# Patient Record
Sex: MALE | Race: OTHER | NOT HISPANIC OR LATINO | URBAN - METROPOLITAN AREA
[De-identification: names, ages, dates, MRNs, and addresses within clinical notes are randomized per-mention and may not be internally consistent; named-entity substitution may affect disease eponyms.]

---

## 2019-04-24 ENCOUNTER — EMERGENCY (EMERGENCY)
Facility: HOSPITAL | Age: 64
LOS: 1 days | Discharge: HOME | End: 2019-04-24
Admitting: EMERGENCY MEDICINE
Payer: COMMERCIAL

## 2019-04-24 VITALS
HEART RATE: 88 BPM | DIASTOLIC BLOOD PRESSURE: 93 MMHG | RESPIRATION RATE: 18 BRPM | TEMPERATURE: 98 F | SYSTOLIC BLOOD PRESSURE: 154 MMHG | OXYGEN SATURATION: 100 %

## 2019-04-24 PROCEDURE — 73030 X-RAY EXAM OF SHOULDER: CPT | Mod: 26,RT

## 2019-04-24 PROCEDURE — 99283 EMERGENCY DEPT VISIT LOW MDM: CPT

## 2019-04-24 RX ORDER — ACETAMINOPHEN 500 MG
975 TABLET ORAL ONCE
Qty: 0 | Refills: 0 | Status: COMPLETED | OUTPATIENT
Start: 2019-04-24 | End: 2019-04-24

## 2019-04-24 RX ORDER — IBUPROFEN 200 MG
600 TABLET ORAL ONCE
Qty: 0 | Refills: 0 | Status: COMPLETED | OUTPATIENT
Start: 2019-04-24 | End: 2019-04-24

## 2019-04-24 RX ORDER — DIAZEPAM 5 MG
5 TABLET ORAL ONCE
Qty: 0 | Refills: 0 | Status: DISCONTINUED | OUTPATIENT
Start: 2019-04-24 | End: 2019-04-24

## 2019-04-24 RX ADMIN — Medication 600 MILLIGRAM(S): at 09:50

## 2019-04-24 RX ADMIN — Medication 5 MILLIGRAM(S): at 09:50

## 2019-04-24 RX ADMIN — Medication 975 MILLIGRAM(S): at 09:50

## 2019-04-24 NOTE — ED PROVIDER NOTE - PHYSICAL EXAMINATION
right shoulder: +deltoid tenderness to palpation. no bruising or swelling. no gross deformity. limited flexion secondary to pain. sensation intact to deltoid and throughout. radial pulse 2+. capillary refill <2 seconds. + strength equal b/l. +elbow flexion and extension 5/5.

## 2019-04-24 NOTE — ED PROVIDER NOTE - CLINICAL SUMMARY MEDICAL DECISION MAKING FREE TEXT BOX
64 y/o male with no pmhx presents to ED c/o right shoulder pain s/p lifting heavy object yesterday. r.o dislocation, fracture. plan- pain control, valium, xray, reassess.

## 2019-04-24 NOTE — ED ADULT TRIAGE NOTE - CHIEF COMPLAINT QUOTE
Pt states yesterday lifted a heavy object and injured his shoulder . pt reports limited rom to right arm.

## 2019-04-24 NOTE — ED PROVIDER NOTE - OBJECTIVE STATEMENT
62 y/o male with no pmhx presents to ED c/o right shoulder pain s/p lifting heavy object yesterday. States he heard a pop in his shoulder and as a result ended dropping the object. 8/10 pain with movement. Minimal pain with no movement. Did not take pain meds. No hx of shoulder dislocations. No fever, chills, cp, sob, palpitations, n/v, weakness, numbness, tingling, lacerations, swelling or bruising.

## 2019-04-24 NOTE — ED PROVIDER NOTE - NSFOLLOWUPINSTRUCTIONS_ED_ALL_ED_FT
Follow up with Ortho within the week- referral list provided.  Take Motrin 600mg 1 tab every 6-8 hrs as needed with food for pain. Keep sling on for support.  Worsening pain, numbness, weakness, swelling or new concerning symptoms return to the Emergency Department.

## 2019-04-30 PROBLEM — Z78.9 OTHER SPECIFIED HEALTH STATUS: Chronic | Status: ACTIVE | Noted: 2019-04-24

## 2019-05-03 ENCOUNTER — APPOINTMENT (OUTPATIENT)
Dept: ORTHOPEDIC SURGERY | Facility: CLINIC | Age: 64
End: 2019-05-03

## 2019-05-07 ENCOUNTER — APPOINTMENT (OUTPATIENT)
Dept: ORTHOPEDIC SURGERY | Facility: CLINIC | Age: 64
End: 2019-05-07
Payer: COMMERCIAL

## 2019-05-07 VITALS
HEART RATE: 75 BPM | BODY MASS INDEX: 25.39 KG/M2 | DIASTOLIC BLOOD PRESSURE: 80 MMHG | SYSTOLIC BLOOD PRESSURE: 131 MMHG | WEIGHT: 158 LBS | HEIGHT: 66 IN

## 2019-05-07 PROCEDURE — 99205 OFFICE O/P NEW HI 60 MIN: CPT

## 2019-05-07 PROCEDURE — 73030 X-RAY EXAM OF SHOULDER: CPT | Mod: RT

## 2019-05-07 NOTE — PHYSICAL EXAM
[de-identified] : Physical Examination\par General: well nourished, in no acute distress, alert and oriented to person, place and time\par Psychiatric: normal mood and affect, no abnormal movements or speech patterns\par Eyes: vision intact - glasses\par Throat: no thyromegaly\par Lymph: no enlarged nodes, no lymphedema in extremity\par Respiratory: no wheezing, no shortness of breath with ambulation\par Cardiac: no cardiac leg swelling, 2+ peripheral pulses\par Neurology: normal gross sensation in extremities to light touch\par Abdomen: soft, non-tender, tympanic, no masses\par \par Musculoskeletal Examination\par Cervical spine	Full painless range of motion and negative Spurling's test\par \par Shoulder			Right			Left\par Appearance\par      Skin/Swelling/Deformity	normal			normal\par      Scapular Winging		-			-\par Range of Motion\par      Forward Flexion		20 / 150		170 / 170\par      Abduction			20 / 150		170 / 170\par      External Rotation		45			45\par      Internal Rotation		post hip			T10\par      SAbd Ext Rotation		70			90\par      SAbd Int Rotation		30			80\par      Painful Arc			+			-\par      Crepitus			-			-\par Palpation\par      Clavicle			-			-\par      AC Joint			-			-\par      Posterior Acromion		-			-\par      Levator Scapula		-			-\par      Lateral Bursa			+			-\par      Impingement Area		+			-\par      Biceps Tendon		+			-\par      Anterior Capsule		+			-\par Strength Examination\par      Supraspinatous 		3+ / +			5+ / 0\par      Infraspinatous			3+ / +			5+ / 0\par      Subscapularis			5+ / 0			5+ / 0\par      Belly Press			5+ / 0			5+ / 0\par      Lift Off			-&			-\par      Drop-Arm			+			-\par Special Examination\par      Biceps Sterling's		+ both			-\par      Impingement Neer		+			-\par      Impingement Hawking		+			-\par \par Sensation\par      Axillary			normal			normal\par      LatAntCubBrach 		normal			normal\par      Median 			normal			normal\par      Ulnar 			normal			normal\par      Radial 			normal			normal\par Motor\par      AIN 				normal			normal\par      Ulnar 			normal			normal\par      Radial 			normal			normal\par      PIN 				normal			normal\par Pulses\par      Radial			2+			2+ [de-identified] : 4 views of the affected Right shoulder (AP, Glenoid, Y-View, Axillary)\par were ordered, obtained and evaluated by myself today and\par demonstrate:\par normal bony calcification without dislocation and no fracture\par 	Arch	2B\par 	AC Joint	mild Arthrosis\par 	GH Joint	trace Arthrosis\par 	Calcifications	none

## 2019-05-07 NOTE — HISTORY OF PRESENT ILLNESS
[de-identified] : CC Right shoulder\par \par HPI 64 yo male right HD presents with acute onset of 2 weeks of activity related and constant pain in the lateral Right shoulder after lifting a heavy vacuum  and immediate loss of strength and ability to elevate the arm. The pain is better, and rated a 5 out of 10, described as weakness burning aching she, [without radiation]. Rest makes the pain better and shoulder motion and lifting objects makes the pain worse. The patient reports associated symptoms of instability weakness. The patient + pain at night affecting sleep, - neck pain, and - similar pain previously.\par \par The patient has tried the following treatments:\par Activity modification	+\par Ice			+\par Nsaids    		+\par Physical Therapy  	-\par Cortisone Injection	-\par Arthroscopy/Surgery	-\par \par Review of Systems is positive for the above musculoskeletal symptoms and is otherwise non-contributory for general, constitutional, psychiatric, neurologic, HEENT, cardiac, respiratory, gastrointestinal, reproductive, lymphatic, and dermatologic complaints.\par \par Consult by Dr Ziggy Phipps

## 2019-05-07 NOTE — DISCUSSION/SUMMARY
[de-identified] : Right shoulder rotator cuff  insufficiency, biceps tendinitis, impingement syndrome\par \par I discussed my findings on history, exam and radiology.\par \par I reviewed the anatomy and function of the shoulder rotator cuff muscles and tendons, biceps tendon and labrum.Given the current findings for the patient, I recommend proceeding with advanced imaging to better characterize and diagnose the problem to aid patient care, management and treatment guidance as I suspect an internal injury to the knee not diagnosed on non-diagnostic plain radiographs causing the patients symptoms and physical examination to help provide surgical management planning.\par \par Therefore given the patients continued symptoms despite activity modification and anti-inflammatory pain medications and ice the patient has continued pain and weakness and impaired sleep affecting the patient's ADLs and limiting activities negatively affecting the patient's quality of life, examination and history consistent with internal shoulder derangement or rotator cuff injury with weakness of the rotator cuff muscles along with severe pain I am recommending obtaining advanced MRI imaging of the shoulder to identify damaged structures in order to facilite preopertive planning. I discussed with the patient that I am ordering an MRI to assess the soft tissue structures in the joint such as the joint capsule, ligaments, rotator cuff and biceps tendons, as well as to assess the cartilage, muscle bellies, cysts, AC joint edema or other pathology. The test will need insurance approval and will take place at a [facility] facility or outside facility. If the patient elects to obtain the MRI from an outside facility, the patient understands they have been instructed to bring in both the final radiologist read and a CD/DVD with the MRI images to allow review of the imaging test by myself during the follow up visit. I do not recommend obtaining an open standing MRI as the quality of the images is subpar and makes it difficult to diagnose intra-articular derangements and guide care discussion and decision making.\par \par The patient was prescribed Diclofenac PO non-steroidal anti-inflammatory medication. 50mg tablets twice daily to be taken for at least 1-2 weeks in a row and then PRN afterwards. Risks and benefits were discussed and include but not limited to renal damage and GI ulceration and bleeding.  They were advised to take with food to limit stomach upset as well as warned to stop the medication if worsening gastric pain or dizziness or other side effects. Also to immediately stop the medication and seek appropriate medical attention if any severe stomach ache, gastritis, black/red vomit, black/red stools or any other medical concern.\par \par The patient verifies their understanding the the visit, diagnosis and plan. They agree with the treatment plan and will contact the office with any questions or problems.\par \par FU after MRI completed

## 2019-06-05 ENCOUNTER — APPOINTMENT (OUTPATIENT)
Dept: ORTHOPEDIC SURGERY | Facility: CLINIC | Age: 64
End: 2019-06-05
Payer: COMMERCIAL

## 2019-06-05 PROCEDURE — 99214 OFFICE O/P EST MOD 30 MIN: CPT

## 2019-06-07 ENCOUNTER — MOBILE ON CALL (OUTPATIENT)
Age: 64
End: 2019-06-07

## 2019-06-11 NOTE — HISTORY OF PRESENT ILLNESS
[de-identified] : CC Right shoulder\par \par HPI 64 yo male right HD presents with MRI review of acute onset of 2 weeks of activity related and constant pain in the lateral Right shoulder after lifting a heavy vacuum  and immediate loss of strength and ability to elevate the arm. The pain is better, and rated a 5 out of 10, described as weakness burning aching she, [without radiation]. Rest makes the pain better and shoulder motion and lifting objects makes the pain worse. The patient reports associated symptoms of instability weakness. The patient + pain at night affecting sleep, - neck pain, and - similar pain previously.\par \par The patient has tried the following treatments:\par Activity modification	+\par Ice			+\par Nsaids    		+\par Physical Therapy  	-\par Cortisone Injection	-\par Arthroscopy/Surgery	-\par \par Review of Systems is positive for the above musculoskeletal symptoms and is otherwise non-contributory for general, constitutional, psychiatric, neurologic, HEENT, cardiac, respiratory, gastrointestinal, reproductive, lymphatic, and dermatologic complaints.\par \par Consult by Dr Ziggy Phipps

## 2019-06-11 NOTE — DISCUSSION/SUMMARY
[de-identified] : Right shoulder rotator cuff full thickness, biceps tendinitis, impingement syndrome\par \par I discussed my findings on history, exam and radiology.\par \par We discussed at length the anatomy, function and tear pattern of the rotator cuff using models, diagrams and drawings. We reviewed the patient's physical exam, radiographic images and history. We discussed the expected outcome of non-operative conservative treatment versus arthroscopic operative rotator cuff repair. Non-operative management consists of patient education, activity modification, corticosteroid injection, PO or topical NSAIDs, and formal physical therapy. Partial thickness tears do have some limited healing potential but infrequently heal completely and with time sometimes progress towards a focal full thickness tear. Smaller tears without retraction are expected to progress and enlarge overtime to larger full thickness tears with retraction. Full thickness rotator cuff tears, in a vast majority of circumstances, do not heal without surgical treatment. The larger the tear becomes, the more difficult the repair is technically and protracts and slows rehabilitation. As the full thickness rotator cuff tear progresses over time, the torn tendon edge retracted due to intrinsic tendon changes to its strength and elasticity, which along with progressive rotator cuff muscle belly atrophy and fatty degeneration makes surgical management both less effective and more difficult. Given enough chronic tendon changes and degenerative muscle changes, the cuff may become unrepairable, necessitating larger, more costly, less predictable poorer outcome, reconstructive arthroscopic or open surgeries including a reverse shoulder replacement.\par \par Operative and nonoperative management modalities were discussed with the patient\par Patient wishes to proceed with nonoperative modalities at this time\par \par Physical therapy was prescribed for ROM exercises, cuff/periscapular strengthing, scapular posture, modalities PRN, home exercise program\par Continue prescribed Diclofenac PO non-steroidal anti-inflammatory medication. 50mg tablets twice daily to be taken for at least 1-2 weeks in a row and then PRN afterwards. Risks and benefits were discussed and include but not limited to renal damage and GI ulceration and bleeding.  They were advised to take with food to limit stomach upset as well as warned to stop the medication if worsening gastric pain or dizziness or other side effects. Also to immediately stop the medication and seek appriate medical attention if any severe stomach ache, gastritis, black/red vomit, black/red stools or any other medical concern.\par \par Long-term consequences of failure to repair rotator cuff were discussed I advised the patient have routine followup continue with nonoperative management\par \par Do not recommend a corticosteroid injection at this time due possibility of a rotator cuff repair due to increased incidence of infection as well as the possible risk of decreased tendon to bone healing\par \par The patient verifies their understanding the the visit, diagnosis and plan. They agree with the treatment plan and will contact the office with any questions or problems.\par Follow up\par After PT completed\par \par addendum.\par patient decided to proceed with surgery

## 2019-06-11 NOTE — PHYSICAL EXAM
[de-identified] : Physical Examination\par General: well nourished, in no acute distress, alert and oriented to person, place and time\par Psychiatric: normal mood and affect, no abnormal movements or speech patterns\par Eyes: vision intact - glasses\par Throat: no thyromegaly\par Lymph: no enlarged nodes, no lymphedema in extremity\par Respiratory: no wheezing, no shortness of breath with ambulation\par Cardiac: no cardiac leg swelling, 2+ peripheral pulses\par Neurology: normal gross sensation in extremities to light touch\par Abdomen: soft, non-tender, tympanic, no masses\par \par Musculoskeletal Examination\par Cervical spine	Full painless range of motion and negative Spurling's test\par \par Shoulder			Right			Left\par Appearance\par      Skin/Swelling/Deformity	normal			normal\par      Scapular Winging		-			-\par Range of Motion\par      Forward Flexion		20 / 150		170 / 170\par      Abduction			20 / 150		170 / 170\par      External Rotation		45			45\par      Internal Rotation		post hip			T10\par      SAbd Ext Rotation		70			90\par      SAbd Int Rotation		30			80\par      Painful Arc			+			-\par      Crepitus			-			-\par Palpation\par      Clavicle			-			-\par      AC Joint			-			-\par      Posterior Acromion		-			-\par      Levator Scapula		-			-\par      Lateral Bursa			+			-\par      Impingement Area		+			-\par      Biceps Tendon		+			-\par      Anterior Capsule		+			-\par Strength Examination\par      Supraspinatous 		3+ / +			5+ / 0\par      Infraspinatous			3+ / +			5+ / 0\par      Subscapularis			5+ / 0			5+ / 0\par      Belly Press			5+ / 0			5+ / 0\par      Lift Off			-&			-\par      Drop-Arm			+			-\par Special Examination\par      Biceps Washington's		+ both			-\par      Impingement Neer		+			-\par      Impingement Hawking		+			-\par \par Sensation\par      Axillary			normal			normal\par      LatAntCubBrach 		normal			normal\par      Median 			normal			normal\par      Ulnar 			normal			normal\par      Radial 			normal			normal\par Motor\par      AIN 				normal			normal\par      Ulnar 			normal			normal\par      Radial 			normal			normal\par      PIN 				normal			normal\par Pulses\par      Radial			2+			2+ [de-identified] : 4 views of the affected Right shoulder (AP, Glenoid, Y-View, Axillary)\par 5-2019\par demonstrate:\par normal bony calcification without dislocation and no fracture\par 	Arch	2B\par 	AC Joint	mild Arthrosis\par 	GH Joint	trace Arthrosis\par 	Calcifications	none\par \par MRI Right shoulder from [facility] on 5-21-19\par My impression of the images:\par Quality of the MRI is good\par Supraspinatous Tendon tear of the supraspinatus tendon of the entire footprint with retraction to the mid humeral head\par Infraspinatous Tendon tendinosis\par Subscapularis Tendon tendinosis with inferior articular partial tear\par Teres Minor Tendon ok\par Muscle Belly Atrophy none\par Biceps Tendon is in the groove and looks swollen intra-articularly but poorly visualized with a stable attachment anchor\par Superior Labrum ok\par Anterior Labrum ok\par Posterior Labrum ok\par AC joint mild signal\par There is no full thickness chondral lesion of the glenoid and humeral head\par \par The Final Radiologist Impression:\par The skin and subcutis are unremarkable.\par There is moderate AC joint osteoarthritis. No subacromial enthesophyte is visualized.\par There is a small effusion in the subacromial/subdeltoid bursa.\par There is a small glenohumeral joint effusion.\par There is a full-thickness tear involving the anterior two thirds of the supraspinatus\par tendon, with retraction of torn tendon fibers at the level of the lateral acromion. See\par oblique coronal images 14-21 of series 5, oblique sagittal images 5-7 of series 6. A\par full-thickness tear is an AP dimension of 1.8 cm.\par The infraspinatus tendon exhibits moderate tendinosis. There is associated edema at the\par myotendinous junction.\par The subscapularis tendon exhibits moderate tendinosis. There is an associated\par enthesopathic cyst at the insertion on the lesser tuberosity.\par The long head biceps tendon exhibits moderate tendinosis and tenosynovitis.\par The capsulolabral complex is unremarkable, without evidence of labrum tear or\par degeneration.\par No significant arthritic changes are visualized at the glenohumeral joint.\par There is no evidence of fracture, osteonecrosis, or osseous neoplasm.\par The suprascapular nerve is unremarkable in MR appearance.\par The visualized muscle bellies exhibit normal size, shape and signal, without evidence of\par edema or atrophy.\par No soft tissue mass is visualized.\par No soft tissue cyst or extrasynovial fluid collection is visualized.\par Impression:\par Rotator cuff tendinosis as detailed above, with a full-thickness tear of the supraspinatus\par tendon. Moderate AC joint osteoarthritis. Small joint and bursal effusions. Moderate long\par head biceps tendinosis and tenosynovitis.

## 2019-07-03 ENCOUNTER — APPOINTMENT (OUTPATIENT)
Dept: ORTHOPEDIC SURGERY | Facility: CLINIC | Age: 64
End: 2019-07-03
Payer: COMMERCIAL

## 2019-07-03 PROCEDURE — 99214 OFFICE O/P EST MOD 30 MIN: CPT

## 2019-07-03 RX ORDER — DOCUSATE SODIUM 100 MG/1
100 CAPSULE, LIQUID FILLED ORAL TWICE DAILY
Qty: 50 | Refills: 0 | Status: COMPLETED | COMMUNITY
Start: 2019-07-03 | End: 2019-07-28

## 2019-07-03 RX ORDER — ASPIRIN 325 MG/1
325 TABLET, FILM COATED ORAL
Qty: 28 | Refills: 0 | Status: COMPLETED | COMMUNITY
Start: 2019-07-03 | End: 2019-08-02

## 2019-07-03 NOTE — DISCUSSION/SUMMARY
[de-identified] : Right shoulder rotator cuff full thickness, biceps tendinitis, impingement syndrome\par \par I discussed my findings on history, exam and radiology.\par \par We discussed at length the anatomy, function and tear pattern of the rotator cuff using models, diagrams and drawings. We reviewed the patient's physical exam, radiographic images and history. We discussed the expected outcome of non-operative conservative treatment versus arthroscopic operative rotator cuff repair. Non-operative management consists of patient education, activity modification, corticosteroid injection, PO or topical NSAIDs, and formal physical therapy. Partial thickness tears do have some limited healing potential but infrequently heal completely and with time sometimes progress towards a focal full thickness tear. Smaller tears without retraction are expected to progress and enlarge overtime to larger full thickness tears with retraction. Full thickness rotator cuff tears, in a vast majority of circumstances, do not heal without surgical treatment. The larger the tear becomes, the more difficult the repair is technically and protracts and slows rehabilitation. As the full thickness rotator cuff tear progresses over time, the torn tendon edge retracted due to intrinsic tendon changes to its strength and elasticity, which along with progressive rotator cuff muscle belly atrophy and fatty degeneration makes surgical management both less effective and more difficult. Given enough chronic tendon changes and degenerative muscle changes, the cuff may become unrepairable, necessitating larger, more costly, less predictable poorer outcome, reconstructive arthroscopic or open surgeries including a reverse shoulder replacement.\par \par Operative and nonoperative management modalities were discussed with the patient\par Patient wishes to proceed with nonoperative modalities at this time\par \par I have discussed the treatment options with Mr. HUBBARD . At this point he remains symptomatic despite trial of non-operative management. He is having persistent symptoms and has been unable to return to his usual and customary occupation. In light of the patient's complaints, my recommendation at this point is to proceed with surgical arthroscopy of the right shoulder with examination under anesthesia, arthroscopic subacromial decompression, possible mini- Barrie versus Barrie resection of the distal clavicle, careful evaluation of the long head of the biceps for any necessary debridement versus tenodesis/tenotomy, careful evaluation of the rotator cuff with debridement versus repair. I have explained the nature of the surgery with images, diagrams and models as well as the expected postoperative course including immobilization in a surgical sling and intensive physical therapy.. The patient is advised of the risks and benefits and alternatives of surgery. Risks include, but were not limited to future surgeries, infection, bleeding, damage to blood vessels and nerves, continued pain, inability to complete tear repair due to size and condition of the tear, failure of cuff repair to heal, re-tear of the rotator cuff, loss of motion, deep venous thrombosis/pulmonary embolus, acromial stress fracture, AC joint instability, nerve injury, iatrogenic injury to shoulder structures, positional complications including eye irritation and compression, complications due to anesthesia including myocardial infarction, stroke, death, etc.The Mr. HUBBARD's questions were answered. He appeared to understand the risks, benefits, and alternatives of the surgical procedure. I advised that there are no guarantees as to outcome and that the ultimate improvement will be largely based on the extent of rotator cuff and long head biceps involvement and that he may not gain full strength nor full range motion of the shoulder\par \par Mr. HUBBARD  gave written and verbal consent to proceed.\par \par \par Mr. HUBBARD denies personal or family history of DVT/blood clots, has been ambulatory and does not smoke. No risk factors for DVT/PE, will prescribe [id number] daily for 4 weeks postoperatively beginning POD 1.\par \par He was  instructed on signs and symptoms of DVT/PE including lower leg swelling, calf cramp like pain, difficulty breaking, shortness of breath and chest pain. They will proceed immediately to the ER if any of these symptoms occur and contact me.\par \par He was prescribed a 3 day course of oral antibiotics to prevent against surgical infection. Instructed to start after returning home from surgery when able to tolerate PO food intake. This was prescribed to decrease the possibility of postop infection.\par \par For post operative pain control, the patient was given a prescription for percocet 5/325 1-2 every 4-6 hour to not exceed 4g tylenol per 24 hour period. I recommend taking medication as necessary postop for pain control. Taking NSAIDs such as Advil or Aleve is also ok with the narcotic pain medication. I did warn Mr. HUBBARD against concomitant use of Tylenol since the narcotic pain medication includes Tylenol already, as taking both together can injure the liver.\par \par FU postop 1-2 weeks

## 2019-07-03 NOTE — PHYSICAL EXAM
[de-identified] : Physical Examination\par General: well nourished, in no acute distress, alert and oriented to person, place and time\par Psychiatric: normal mood and affect, no abnormal movements or speech patterns\par Eyes: vision intact - glasses\par Throat: no thyromegaly\par Lymph: no enlarged nodes, no lymphedema in extremity\par Respiratory: no wheezing, no shortness of breath with ambulation\par Cardiac: no cardiac leg swelling, 2+ peripheral pulses\par Neurology: normal gross sensation in extremities to light touch\par Abdomen: soft, non-tender, tympanic, no masses\par \par Musculoskeletal Examination\par Cervical spine	Full painless range of motion and negative Spurling's test\par \par Shoulder			Right			Left\par Appearance\par      Skin/Swelling/Deformity	normal			normal\par      Scapular Winging		-			-\par Range of Motion\par      Forward Flexion		20 / 150		170 / 170\par      Abduction			20 / 150		170 / 170\par      External Rotation		45			45\par      Internal Rotation		post hip			T10\par      SAbd Ext Rotation		70			90\par      SAbd Int Rotation		30			80\par      Painful Arc			+			-\par      Crepitus			-			-\par Palpation\par      Clavicle			-			-\par      AC Joint			-			-\par      Posterior Acromion		-			-\par      Levator Scapula		-			-\par      Lateral Bursa			+			-\par      Impingement Area		+			-\par      Biceps Tendon		+			-\par      Anterior Capsule		+			-\par Strength Examination\par      Supraspinatous 		3+ / +			5+ / 0\par      Infraspinatous			3+ / +			5+ / 0\par      Subscapularis			5+ / 0			5+ / 0\par      Belly Press			5+ / 0			5+ / 0\par      Lift Off			-&			-\par      Drop-Arm			+			-\par Special Examination\par      Biceps Turin's		+ both			-\par      Impingement Neer		+			-\par      Impingement Hawking		+			-\par \par Sensation\par      Axillary			normal			normal\par      LatAntCubBrach 		normal			normal\par      Median 			normal			normal\par      Ulnar 			normal			normal\par      Radial 			normal			normal\par Motor\par      AIN 				normal			normal\par      Ulnar 			normal			normal\par      Radial 			normal			normal\par      PIN 				normal			normal\par Pulses\par      Radial			2+			2+ [de-identified] : 4 views of the affected Right shoulder (AP, Glenoid, Y-View, Axillary)\par 5-2019\par demonstrate:\par normal bony calcification without dislocation and no fracture\par 	Arch	2B\par 	AC Joint	mild Arthrosis\par 	GH Joint	trace Arthrosis\par 	Calcifications	none\par \par MRI Right shoulder from [facility] on 5-21-19\par My impression of the images:\par Quality of the MRI is good\par Supraspinatous Tendon tear of the supraspinatus tendon of the entire footprint with retraction to the mid humeral head\par Infraspinatous Tendon tendinosis\par Subscapularis Tendon tendinosis with inferior articular partial tear\par Teres Minor Tendon ok\par Muscle Belly Atrophy none\par Biceps Tendon is in the groove and looks swollen intra-articularly but poorly visualized with a stable attachment anchor\par Superior Labrum ok\par Anterior Labrum ok\par Posterior Labrum ok\par AC joint mild signal\par There is no full thickness chondral lesion of the glenoid and humeral head\par \par The Final Radiologist Impression:\par The skin and subcutis are unremarkable.\par There is moderate AC joint osteoarthritis. No subacromial enthesophyte is visualized.\par There is a small effusion in the subacromial/subdeltoid bursa.\par There is a small glenohumeral joint effusion.\par There is a full-thickness tear involving the anterior two thirds of the supraspinatus\par tendon, with retraction of torn tendon fibers at the level of the lateral acromion. See\par oblique coronal images 14-21 of series 5, oblique sagittal images 5-7 of series 6. A\par full-thickness tear is an AP dimension of 1.8 cm.\par The infraspinatus tendon exhibits moderate tendinosis. There is associated edema at the\par myotendinous junction.\par The subscapularis tendon exhibits moderate tendinosis. There is an associated\par enthesopathic cyst at the insertion on the lesser tuberosity.\par The long head biceps tendon exhibits moderate tendinosis and tenosynovitis.\par The capsulolabral complex is unremarkable, without evidence of labrum tear or\par degeneration.\par No significant arthritic changes are visualized at the glenohumeral joint.\par There is no evidence of fracture, osteonecrosis, or osseous neoplasm.\par The suprascapular nerve is unremarkable in MR appearance.\par The visualized muscle bellies exhibit normal size, shape and signal, without evidence of\par edema or atrophy.\par No soft tissue mass is visualized.\par No soft tissue cyst or extrasynovial fluid collection is visualized.\par Impression:\par Rotator cuff tendinosis as detailed above, with a full-thickness tear of the supraspinatus\par tendon. Moderate AC joint osteoarthritis. Small joint and bursal effusions. Moderate long\par head biceps tendinosis and tenosynovitis.

## 2019-07-03 NOTE — HISTORY OF PRESENT ILLNESS
[de-identified] : CC Right shoulder\par \par HPI 64 yo male right HD presents for preop discussion of acute onset of 2 weeks of activity related and constant pain in the lateral Right shoulder after lifting a heavy vacuum  and immediate loss of strength and ability to elevate the arm. The pain is better, and rated a 5 out of 10, described as weakness burning aching she, [without radiation]. Rest makes the pain better and shoulder motion and lifting objects makes the pain worse. The patient reports associated symptoms of instability weakness. The patient + pain at night affecting sleep, - neck pain, and - similar pain previously.\par \par The patient has tried the following treatments:\par Activity modification	+\par Ice			+\par Nsaids    		+\par Physical Therapy  	-\par Cortisone Injection	-\par Arthroscopy/Surgery	-\par \par Review of Systems is positive for the above musculoskeletal symptoms and is otherwise non-contributory for general, constitutional, psychiatric, neurologic, HEENT, cardiac, respiratory, gastrointestinal, reproductive, lymphatic, and dermatologic complaints.\par \par Consult by Dr Ziggy Phipps

## 2019-07-15 ENCOUNTER — OUTPATIENT (OUTPATIENT)
Dept: OUTPATIENT SERVICES | Facility: HOSPITAL | Age: 64
LOS: 1 days | End: 2019-07-15

## 2019-07-15 VITALS
OXYGEN SATURATION: 99 % | WEIGHT: 156.09 LBS | DIASTOLIC BLOOD PRESSURE: 81 MMHG | RESPIRATION RATE: 18 BRPM | HEIGHT: 66 IN | TEMPERATURE: 98 F | SYSTOLIC BLOOD PRESSURE: 145 MMHG | HEART RATE: 68 BPM

## 2019-07-15 DIAGNOSIS — S46.011A STRAIN OF MUSCLE(S) AND TENDON(S) OF THE ROTATOR CUFF OF RIGHT SHOULDER, INITIAL ENCOUNTER: ICD-10-CM

## 2019-07-15 DIAGNOSIS — M75.21 BICIPITAL TENDINITIS, RIGHT SHOULDER: ICD-10-CM

## 2019-07-15 DIAGNOSIS — M75.41 IMPINGEMENT SYNDROME OF RIGHT SHOULDER: ICD-10-CM

## 2019-07-15 DIAGNOSIS — Z01.818 ENCOUNTER FOR OTHER PREPROCEDURAL EXAMINATION: ICD-10-CM

## 2019-07-15 RX ORDER — SODIUM CHLORIDE 9 MG/ML
3 INJECTION INTRAMUSCULAR; INTRAVENOUS; SUBCUTANEOUS EVERY 8 HOURS
Refills: 0 | Status: DISCONTINUED | OUTPATIENT
Start: 2019-07-18 | End: 2019-07-26

## 2019-07-15 NOTE — H&P PST ADULT - ASSESSMENT
63 yr old male with no significant PMH of presents with c/o right shoulder pain due to rotator cuff injury sustained after lifting up a heavy object. Pt reports worsening of pain with arm movement and with heavy lifting. Pt for right shoulder arthroscopy, rotator cuff repair, biceps tenosis, possible tenotomy, subacromial decompression on 7/18/2019.

## 2019-07-15 NOTE — H&P PST ADULT - RS GEN PE MLT RESP DETAILS PC
normal/airway patent/clear to auscultation bilaterally/breath sounds equal/no intercostal retractions/no rhonchi/no rales/no subcutaneous emphysema/no wheezes/respirations non-labored/no chest wall tenderness/good air movement

## 2019-07-15 NOTE — H&P PST ADULT - NSICDXPASTMEDICALHX_GEN_ALL_CORE_FT
PAST MEDICAL HISTORY:  Bicipital tendinitis, right shoulder     Impingement syndrome of right shoulder     No pertinent past medical history     Strain of muscle(s) and tendon(s) of the rotator cuff of right shoulder, initial encounter

## 2019-07-15 NOTE — H&P PST ADULT - NSICDXFAMILYHX_GEN_ALL_CORE_FT
FAMILY HISTORY:  Family history of bone cancer, brother  Family history of diabetes mellitus, mother and brother  Family history of hypertension, mother and brother  Family history of hypertension in sister, 2 sisters  Family history of lung cancer, brother

## 2019-07-15 NOTE — H&P PST ADULT - NEGATIVE CARDIOVASCULAR SYMPTOMS
no peripheral edema/no claudication/no palpitations/no chest pain/no dyspnea on exertion/no paroxysmal nocturnal dyspnea/no orthopnea

## 2019-07-15 NOTE — H&P PST ADULT - NSICDXPROBLEM_GEN_ALL_CORE_FT
PROBLEM DIAGNOSES  Problem: Traumatic tear of right rotator cuff  Assessment and Plan: right shoulder arthroscopy, rotator cuff repair, biceps tenodesis, possible tenotomy, subacromial decompression on 7/18/2019. Preoperative instructions given. Verbalized understanding of instructions given.

## 2019-07-15 NOTE — H&P PST ADULT - GASTROINTESTINAL DETAILS
bowel sounds normal/no masses palpable/soft/normal/nontender/no bruit/no distention/no rebound tenderness
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
## 2019-07-17 ENCOUNTER — TRANSCRIPTION ENCOUNTER (OUTPATIENT)
Age: 64
End: 2019-07-17

## 2019-07-18 ENCOUNTER — OUTPATIENT (OUTPATIENT)
Dept: OUTPATIENT SERVICES | Facility: HOSPITAL | Age: 64
LOS: 1 days | End: 2019-07-18
Payer: COMMERCIAL

## 2019-07-18 ENCOUNTER — APPOINTMENT (OUTPATIENT)
Dept: ORTHOPEDIC SURGERY | Facility: HOSPITAL | Age: 64
End: 2019-07-18

## 2019-07-18 VITALS
RESPIRATION RATE: 17 BRPM | DIASTOLIC BLOOD PRESSURE: 76 MMHG | SYSTOLIC BLOOD PRESSURE: 137 MMHG | HEART RATE: 87 BPM | TEMPERATURE: 98 F | OXYGEN SATURATION: 99 %

## 2019-07-18 VITALS
HEIGHT: 66 IN | TEMPERATURE: 98 F | DIASTOLIC BLOOD PRESSURE: 78 MMHG | SYSTOLIC BLOOD PRESSURE: 139 MMHG | HEART RATE: 71 BPM | OXYGEN SATURATION: 100 % | WEIGHT: 156.09 LBS | RESPIRATION RATE: 16 BRPM

## 2019-07-18 DIAGNOSIS — S46.011A STRAIN OF MUSCLE(S) AND TENDON(S) OF THE ROTATOR CUFF OF RIGHT SHOULDER, INITIAL ENCOUNTER: ICD-10-CM

## 2019-07-18 DIAGNOSIS — M75.41 IMPINGEMENT SYNDROME OF RIGHT SHOULDER: ICD-10-CM

## 2019-07-18 DIAGNOSIS — Z01.818 ENCOUNTER FOR OTHER PREPROCEDURAL EXAMINATION: ICD-10-CM

## 2019-07-18 DIAGNOSIS — M75.21 BICIPITAL TENDINITIS, RIGHT SHOULDER: ICD-10-CM

## 2019-07-18 PROCEDURE — 29827 SHO ARTHRS SRG RT8TR CUF RPR: CPT | Mod: RT

## 2019-07-18 PROCEDURE — 29826 SHO ARTHRS SRG DECOMPRESSION: CPT | Mod: RT

## 2019-07-18 PROCEDURE — C1713: CPT

## 2019-07-18 RX ORDER — ACETAMINOPHEN 500 MG
2 TABLET ORAL
Qty: 0 | Refills: 0 | DISCHARGE

## 2019-07-18 RX ORDER — SODIUM CHLORIDE 9 MG/ML
1000 INJECTION, SOLUTION INTRAVENOUS
Refills: 0 | Status: DISCONTINUED | OUTPATIENT
Start: 2019-07-18 | End: 2019-07-26

## 2019-07-18 RX ORDER — CHOLECALCIFEROL (VITAMIN D3) 125 MCG
1 CAPSULE ORAL
Qty: 0 | Refills: 0 | DISCHARGE

## 2019-07-18 RX ORDER — HYDROMORPHONE HYDROCHLORIDE 2 MG/ML
0.5 INJECTION INTRAMUSCULAR; INTRAVENOUS; SUBCUTANEOUS
Refills: 0 | Status: DISCONTINUED | OUTPATIENT
Start: 2019-07-18 | End: 2019-07-18

## 2019-07-18 RX ORDER — CELECOXIB 200 MG/1
200 CAPSULE ORAL ONCE
Refills: 0 | Status: COMPLETED | OUTPATIENT
Start: 2019-07-18 | End: 2019-07-18

## 2019-07-18 RX ORDER — CEPHALEXIN 500 MG
1 CAPSULE ORAL
Qty: 0 | Refills: 0 | DISCHARGE

## 2019-07-18 RX ORDER — OXYCODONE AND ACETAMINOPHEN 5; 325 MG/1; MG/1
2 TABLET ORAL EVERY 6 HOURS
Refills: 0 | Status: DISCONTINUED | OUTPATIENT
Start: 2019-07-18 | End: 2019-07-18

## 2019-07-18 RX ORDER — HYDROMORPHONE HYDROCHLORIDE 2 MG/ML
1 INJECTION INTRAMUSCULAR; INTRAVENOUS; SUBCUTANEOUS
Refills: 0 | Status: DISCONTINUED | OUTPATIENT
Start: 2019-07-18 | End: 2019-07-18

## 2019-07-18 RX ORDER — IBUPROFEN 200 MG
1 TABLET ORAL
Qty: 0 | Refills: 0 | DISCHARGE

## 2019-07-18 RX ORDER — OXYCODONE AND ACETAMINOPHEN 5; 325 MG/1; MG/1
1 TABLET ORAL EVERY 4 HOURS
Refills: 0 | Status: DISCONTINUED | OUTPATIENT
Start: 2019-07-18 | End: 2019-07-18

## 2019-07-18 RX ORDER — ASPIRIN/CALCIUM CARB/MAGNESIUM 324 MG
1 TABLET ORAL
Qty: 0 | Refills: 0 | DISCHARGE

## 2019-07-18 RX ORDER — ONDANSETRON 8 MG/1
4 TABLET, FILM COATED ORAL EVERY 6 HOURS
Refills: 0 | Status: DISCONTINUED | OUTPATIENT
Start: 2019-07-18 | End: 2019-07-26

## 2019-07-18 RX ORDER — ACETAMINOPHEN 500 MG
975 TABLET ORAL ONCE
Refills: 0 | Status: COMPLETED | OUTPATIENT
Start: 2019-07-18 | End: 2019-07-18

## 2019-07-18 RX ADMIN — CELECOXIB 200 MILLIGRAM(S): 200 CAPSULE ORAL at 07:31

## 2019-07-18 RX ADMIN — Medication 975 MILLIGRAM(S): at 07:30

## 2019-07-18 RX ADMIN — SODIUM CHLORIDE 3 MILLILITER(S): 9 INJECTION INTRAMUSCULAR; INTRAVENOUS; SUBCUTANEOUS at 07:23

## 2019-07-18 NOTE — ASU DISCHARGE PLAN (ADULT/PEDIATRIC) - CARE PROVIDER_API CALL
Ralph Beth)  Orthopaedic Surgery  [address] [place] [address], [address] [place] [address]  Phone: [phone]  Fax: [phone]  Follow Up Time: 1 week

## 2019-07-18 NOTE — BRIEF OPERATIVE NOTE - OPERATION/FINDINGS
large avulsion cuff tear  high grade biceps tear  significant synovityis subacromial and large anterior spur acromion over tendon tear

## 2019-07-18 NOTE — BRIEF OPERATIVE NOTE - NSICDXBRIEFPOSTOP_GEN_ALL_CORE_FT
POST-OP DIAGNOSIS:  Traumatic tear of right rotator cuff 18-Jul-2019 12:34:05  Ralph Beth  Rupture of right biceps tendon 18-Jul-2019 12:33:57  Ralph Beth  Shoulder impingement, right 18-Jul-2019 12:33:44  Ralph Beth

## 2019-07-18 NOTE — BRIEF OPERATIVE NOTE - NSICDXBRIEFPROCEDURE_GEN_ALL_CORE_FT
PROCEDURES:  Arthroscopic acromioplasty 18-Jul-2019 12:32:20  Ralph Beth  Repair, rotator cuff, arthroscopic 18-Jul-2019 12:32:09  Ralph Beth

## 2019-07-18 NOTE — BRIEF OPERATIVE NOTE - NSICDXBRIEFPREOP_GEN_ALL_CORE_FT
PRE-OP DIAGNOSIS:  Shoulder impingement, right 18-Jul-2019 12:33:27  Ralph Beth  Traumatic partial tear of right biceps tendon 18-Jul-2019 12:32:45  Ralph Beth  Traumatic complete tear of right rotator cuff 18-Jul-2019 12:32:35  Ralph Beth

## 2019-07-18 NOTE — ASU DISCHARGE PLAN (ADULT/PEDIATRIC) - ASU DC SPECIAL INSTRUCTIONSFT
For the next 24 hours: Until you have full feeling back, you are at risk for falls and injury. Be careful not to bump your LEFT arm . Move slowly and carefully. DO NOT TOUCH anything that might be hot, cold or sharp. You may not feel the skin burn or cut until it is severe. Protect the arm from heat, cold or sharp objects and extreme weather. KEEP your sling in place at all times until you see your doctor.

## 2019-07-18 NOTE — ASU DISCHARGE PLAN (ADULT/PEDIATRIC) - CALL YOUR DOCTOR IF YOU HAVE ANY OF THE FOLLOWING:
Swelling that gets worse/Fever greater than (need to indicate Fahrenheit or Celsius)/Pain not relieved by Medications

## 2019-07-18 NOTE — ASU DISCHARGE PLAN (ADULT/PEDIATRIC) - ACTIVITY LEVEL
No weight bearing/Right arm in shoulder immobilizer/Elevate extremity No heavy lifting/Right arm in shoulder immobilizer/No excercise/No sports/gym/No weight bearing/Elevate extremity

## 2019-07-29 ENCOUNTER — APPOINTMENT (OUTPATIENT)
Dept: ORTHOPEDIC SURGERY | Facility: CLINIC | Age: 64
End: 2019-07-29
Payer: MEDICARE

## 2019-07-29 PROBLEM — M75.21 BICIPITAL TENDINITIS, RIGHT SHOULDER: Chronic | Status: ACTIVE | Noted: 2019-07-15

## 2019-07-29 PROBLEM — M75.41 IMPINGEMENT SYNDROME OF RIGHT SHOULDER: Chronic | Status: ACTIVE | Noted: 2019-07-15

## 2019-07-29 PROBLEM — S46.011A STRAIN OF MUSCLE(S) AND TENDON(S) OF THE ROTATOR CUFF OF RIGHT SHOULDER, INITIAL ENCOUNTER: Chronic | Status: ACTIVE | Noted: 2019-07-15

## 2019-07-29 PROCEDURE — 99024 POSTOP FOLLOW-UP VISIT: CPT

## 2019-07-29 NOTE — HISTORY OF PRESENT ILLNESS
[de-identified] : Patient is status post Right Shoulder Arthroscopic surgery on 7-18-19\par Supraspinatus double row repair and undersurface possible repair of the infraspinatus\par Arthroscopic biceps tenotomy\par      [ Subacromial decompression ]\par \par \par \par The patient is doing well with improved pain postoperatively, is [ not ] taking narcotics for pain.\par They have been doing postoperative icing, compressive dressing and exercises as directed with improving swelling, pain and motion.\par They have been complaint with postoperative sling immobilization\par They are taking ASA as directed for postoperative DVT ppx, Abx ppx completed\par \par The patient denies shortness of breath, chest pain, numbness tingling, worsening calf pain or swelling.\par \par Right Upper Ext\par \par Dressing intact\par Steri-Strips intact\par Incisions are intact\par There is no erythema induration warmth or tenderness about the incisions\par There is mild swelling remaining [ and ecchymosis ]\par Shoulder pendulum motion is smooth\par Elbow ROM is [ full ]\par Motor is intact AIN/PIN/Ulnar/Radial\par Sensory intact median/ulnar/radial distributions\par Palpable radial pulse with brisk capillary refill\par \par \par \par Assessment Plan\par 1.5 weeks status post Right Shoulder Arthroscopic surgery on 7-18-19\par Supraspinatus double row repair and undersurface possible repair of the infraspinatus\par Arthroscopic biceps tenotomy\par      [ Subacromial decompression ]\par \par Steristrips removed and changed sterile\par Pendulum exercises demonstrated and practiced, do 5-10x daily starting in 1 week, withhold due to weak osteoporotic bone\par Continue postoperative exercises\par \par WB status RUE\par      Continue NWB for 6 weeks total\par      Progress 5lbs WB for weeks 6-12\par      Advance 15lbs WB for months 3-5\par      Activities as tolerated months 5+\par \par Hold physical therapy until [ 6 ] weeks\par \par Continue icing the shoulder for pain and swelling\par Continue shower, bathing w submersion of incision ok at 2 weeks\par \par Continue ASA DVT ppx for 1 month\par Abx ppx completed\par \par Surgery and arthroscopic images reviewed and provided for patient\par \par recommend FU w PMD for osteoporotic bone lab evaluation\par \par Follow up:\par 2 weeks at 3.5 weeks postop

## 2019-08-09 ENCOUNTER — APPOINTMENT (OUTPATIENT)
Dept: ORTHOPEDIC SURGERY | Facility: CLINIC | Age: 64
End: 2019-08-09
Payer: MEDICARE

## 2019-08-09 PROCEDURE — 99024 POSTOP FOLLOW-UP VISIT: CPT

## 2019-08-09 NOTE — HISTORY OF PRESENT ILLNESS
[de-identified] : Patient is status post Right Shoulder Arthroscopic surgery on 7-18-19\par Supraspinatus double row repair and undersurface possible repair of the infraspinatus\par Arthroscopic biceps tenotomy\par      [ Subacromial decompression ]\par \par \par \par The patient is doing well with improved pain postoperatively\par They have been doing postoperative icing, compressive dressing and exercises as directed with improving swelling, pain and motion.\par They have been complaint with postoperative sling immobilization, doing penduliums mild pain\par They are taking ASA as directed for postoperative DVT ppx, Abx ppx completed\par \par The patient denies shortness of breath, chest pain, numbness tingling, worsening calf pain or swelling.\par \par Right Upper Ext\par \par Incisions are intact\par There is no erythema induration warmth or tenderness about the incisions\par There is mild swelling remaining [ and ecchymosis ]\par Shoulder pendulum motion is smooth\par Elbow ROM is [ full ]\par Motor is intact AIN/PIN/Ulnar/Radial\par Sensory intact median/ulnar/radial distributions\par Palpable radial pulse with brisk capillary refill\par \par \par \par Assessment Plan\par 3.5 weeks status post Right Shoulder Arthroscopic surgery on 7-18-19\par Supraspinatus double row repair and undersurface possible repair of the infraspinatus\par Arthroscopic biceps tenotomy\par      [ Subacromial decompression ]\par \par reitterated avoid abduction pendulums. Pendulum exercises demonstrated and practiced, do 5-10x daily starting in 1 week, withhold due to weak osteoporotic bone\par Continue postoperative exercises\par \par WB status RUE\par      Continue NWB for 6 weeks total\par      Progress 5lbs WB for weeks 6-12\par      Advance 15lbs WB for months 3-5\par      Activities as tolerated months 5+\par \par Hold physical therapy until [ 6 ] weeks\par \par Continue icing the shoulder for pain and swelling\par Continue shower, bathing w submersion of incision ok at 2 weeks\par \par recommend FU w PMD for osteoporotic bone lab evaluation\par \par Follow up:\par at 6 weeks postop

## 2019-09-04 ENCOUNTER — APPOINTMENT (OUTPATIENT)
Dept: ORTHOPEDIC SURGERY | Facility: CLINIC | Age: 64
End: 2019-09-04
Payer: MEDICARE

## 2019-09-04 PROCEDURE — 99024 POSTOP FOLLOW-UP VISIT: CPT

## 2019-09-04 NOTE — HISTORY OF PRESENT ILLNESS
[de-identified] : Patient is status post Right Shoulder Arthroscopic surgery on 7-18-19\par Supraspinatus double row repair and undersurface possible repair of the infraspinatus\par Arthroscopic biceps tenotomy\par      [ Subacromial decompression ]\par \par \par \par The patient is doing well with improved pain postoperatively\par beginning to use the arm. normal t score on dexa\par \par The patient denies shortness of breath, chest pain, numbness tingling, worsening calf pain or swelling.\par \par Right Upper Ext\par \par Incisions are intact\par There is no erythema induration warmth or tenderness about the incisions\par There is resolved swelling remaining [ and ecchymosis ]\par Shoulder pendulum motion is smooth\par FF 90\par ER 10\par Elbow ROM is [ full ]\par Motor is intact AIN/PIN/Ulnar/Radial\par Sensory intact median/ulnar/radial distributions\par Palpable radial pulse with brisk capillary refill\par \par \par \par Assessment Plan\par 6 weeks status post Right Shoulder Arthroscopic surgery on 7-18-19\par Supraspinatus double row repair and undersurface possible repair of the infraspinatus\par Arthroscopic biceps tenotomy\par      [ Subacromial decompression ]\par \par WB status RUE\par      Progress 5lbs WB for weeks 6-12\par      Advance 15lbs WB for months 3-5\par      Activities as tolerated months 5+\par \par begin physical therapy at [ 6 ] weeks\par \par Continue icing the shoulder for pain and swelling\par Continue shower, bathing w submersion of incision ok at 2 weeks\par \par \par Follow up:\par 4-6 weeks

## 2019-10-16 ENCOUNTER — APPOINTMENT (OUTPATIENT)
Dept: ORTHOPEDIC SURGERY | Facility: CLINIC | Age: 64
End: 2019-10-16
Payer: MEDICARE

## 2019-10-16 PROCEDURE — 99024 POSTOP FOLLOW-UP VISIT: CPT

## 2019-10-16 NOTE — HISTORY OF PRESENT ILLNESS
[de-identified] : Patient is status post Right Shoulder Arthroscopic surgery on 7-18-19\par Supraspinatus double row repair and undersurface possible repair of the infraspinatus\par Arthroscopic biceps tenotomy\par      [ Subacromial decompression ]\par \par \par \par The patient is doing well with improved pain postoperatively\par reports some frustration that shoulder stiffness is improving slower than expected but is seeing improvement slowly w PT\par \par The patient denies shortness of breath, chest pain, numbness tingling, worsening calf pain or swelling.\par \par Right Upper Ext\par \par Incisions are intact\par There is no erythema induration warmth or tenderness about the incisions\par Shoulder pendulum motion is smooth\par /130\par ER 45\par Elbow ROM is [ full ]\par Motor is intact AIN/PIN/Ulnar/Radial\par Sensory intact median/ulnar/radial distributions\par Palpable radial pulse with brisk capillary refill\par \par \par \par Assessment Plan\par 12 weeks status post Right Shoulder Arthroscopic surgery on 7-18-19\par Supraspinatus double row repair and undersurface possible repair of the infraspinatus\par Arthroscopic biceps tenotomy\par      [ Subacromial decompression ]\par \par WB status RUE\par      Advance 15lbs WB for months 3-5\par      Activities as tolerated months 5+\par \par continue PT\par \par Follow up:\par 2-3 months

## 2020-01-06 ENCOUNTER — APPOINTMENT (OUTPATIENT)
Dept: ORTHOPEDIC SURGERY | Facility: CLINIC | Age: 65
End: 2020-01-06
Payer: MEDICARE

## 2020-01-06 PROCEDURE — 99213 OFFICE O/P EST LOW 20 MIN: CPT

## 2020-01-06 NOTE — DISCUSSION/SUMMARY
[de-identified] : 5+ mo status post Right Shoulder Arthroscopic surgery on 7-18-19\par Supraspinatus double row repair and undersurface possible repair of the infraspinatus\par Arthroscopic biceps tenotomy\par  [ Subacromial decompression ]\par \par WB status RUE\par  Activities as tolerated months 5+\par \par continue PT\par \par discussed mild pain w SS and weakness and stiffness\par \par Follow up:\par 2-3 months.

## 2020-01-06 NOTE — PHYSICAL EXAM
[de-identified] : Physical Examination\par General: well nourished, in no acute distress, alert and oriented to person, place and time\par Psychiatric: normal mood and affect, no abnormal movements or speech patterns\par Eyes: vision intact - glasses\par Throat: no thyromegaly\par Lymph: no enlarged nodes, no lymphedema in extremity\par Respiratory: no wheezing, no shortness of breath with ambulation\par Cardiac: no cardiac leg swelling, 2+ peripheral pulses\par Neurology: normal gross sensation in extremities to light touch\par Abdomen: soft, non-tender, tympanic, no masses\par \par Musculoskeletal Examination\par Cervical spine	Full painless range of motion and negative Spurling's test\par \par Shoulder			Right			Left\par Appearance\par      Skin/Swelling/Deformity	normal			normal\par      Scapular Winging		-			-\par Range of Motion\par      Forward Flexion		150 / 150		170 / 170\par      Abduction			120 / 120		170 / 170\par      External Rotation		40			45\par      Internal Rotation		post hip			T10\par      SAbd Ext Rotation		60			90\par      SAbd Int Rotation		40			80\par      Painful Arc			+			-\par      Crepitus			-			-\par Palpation\par      Clavicle			-			-\par      AC Joint			-			-\par      Posterior Acromion		-			-\par      Levator Scapula		-			-\par      Lateral Bursa			-			-\par      Impingement Area		-			-\par      Biceps Tendon		-			-\par      Anterior Capsule		-			-\par Strength Examination\par      Supraspinatous 		5+ / +			5+ / 0\par      Infraspinatous			5- / 0			5+ / 0\par      Subscapularis			5+ / 0			5+ / 0\par      Belly Press			5+ / 0			5+ / 0\par      Lift Off			-			-\par      Drop-Arm			-			-\par Special Examination\par      Biceps Calhoun's		-			-\par      Impingement Neer		+			-\par      Impingement Hawking		+			-\par \par Sensation\par      Axillary			normal			normal\par      LatAntCubBrach 		normal			normal\par      Median 			normal			normal\par      Ulnar 			normal			normal\par      Radial 			normal			normal\par Motor\par      AIN 				normal			normal\par      Ulnar 			normal			normal\par      Radial 			normal			normal\par      PIN 				normal			normal\par Pulses\par      Radial			2+			2+ [de-identified] : 4 views of the affected Right shoulder (AP, Glenoid, Y-View, Axillary)\par 5-2019\par demonstrate:\par normal bony calcification without dislocation and no fracture\par 	Arch	2B\par 	AC Joint	mild Arthrosis\par 	GH Joint	trace Arthrosis\par 	Calcifications	none\par \par MRI Right shoulder from [facility] on 5-21-19\par My impression of the images:\par Quality of the MRI is good\par Supraspinatous Tendon tear of the supraspinatus tendon of the entire footprint with retraction to the mid humeral head\par Infraspinatous Tendon tendinosis\par Subscapularis Tendon tendinosis with inferior articular partial tear\par Teres Minor Tendon ok\par Muscle Belly Atrophy none\par Biceps Tendon is in the groove and looks swollen intra-articularly but poorly visualized with a stable attachment anchor\par Superior Labrum ok\par Anterior Labrum ok\par Posterior Labrum ok\par AC joint mild signal\par There is no full thickness chondral lesion of the glenoid and humeral head\par \par The Final Radiologist Impression:\par The skin and subcutis are unremarkable.\par There is moderate AC joint osteoarthritis. No subacromial enthesophyte is visualized.\par There is a small effusion in the subacromial/subdeltoid bursa.\par There is a small glenohumeral joint effusion.\par There is a full-thickness tear involving the anterior two thirds of the supraspinatus\par tendon, with retraction of torn tendon fibers at the level of the lateral acromion. See\par oblique coronal images 14-21 of series 5, oblique sagittal images 5-7 of series 6. A\par full-thickness tear is an AP dimension of 1.8 cm.\par The infraspinatus tendon exhibits moderate tendinosis. There is associated edema at the\par myotendinous junction.\par The subscapularis tendon exhibits moderate tendinosis. There is an associated\par enthesopathic cyst at the insertion on the lesser tuberosity.\par The long head biceps tendon exhibits moderate tendinosis and tenosynovitis.\par The capsulolabral complex is unremarkable, without evidence of labrum tear or\par degeneration.\par No significant arthritic changes are visualized at the glenohumeral joint.\par There is no evidence of fracture, osteonecrosis, or osseous neoplasm.\par The suprascapular nerve is unremarkable in MR appearance.\par The visualized muscle bellies exhibit normal size, shape and signal, without evidence of\par edema or atrophy.\par No soft tissue mass is visualized.\par No soft tissue cyst or extrasynovial fluid collection is visualized.\par Impression:\par Rotator cuff tendinosis as detailed above, with a full-thickness tear of the supraspinatus\par tendon. Moderate AC joint osteoarthritis. Small joint and bursal effusions. Moderate long\par head biceps tendinosis and tenosynovitis.

## 2020-01-06 NOTE — HISTORY OF PRESENT ILLNESS
[de-identified] : Patient is status post Right Shoulder Arthroscopic surgery on 7-18-19\par Supraspinatus double row repair and undersurface possible repair of the infraspinatus\par Arthroscopic biceps tenotomy\par  [ Subacromial decompression ]\par \par \par \par The patient is doing well with improved pain postoperatively at rest some pain w overhead activity\par still stiff\par \par The patient denies shortness of breath, chest pain, numbness tingling, worsening calf pain or swelling.\par \par Consult by Dr Ziggy Phipps

## 2020-05-11 ENCOUNTER — APPOINTMENT (OUTPATIENT)
Dept: ORTHOPEDIC SURGERY | Facility: CLINIC | Age: 65
End: 2020-05-11

## 2020-05-19 ENCOUNTER — APPOINTMENT (OUTPATIENT)
Dept: ORTHOPEDIC SURGERY | Facility: CLINIC | Age: 65
End: 2020-05-19
Payer: MEDICARE

## 2020-05-19 VITALS
DIASTOLIC BLOOD PRESSURE: 96 MMHG | WEIGHT: 158 LBS | HEART RATE: 86 BPM | SYSTOLIC BLOOD PRESSURE: 159 MMHG | BODY MASS INDEX: 25.39 KG/M2 | HEIGHT: 66 IN | TEMPERATURE: 97.8 F

## 2020-05-19 DIAGNOSIS — S46.011A STRAIN OF MUSCLE(S) AND TENDON(S) OF THE ROTATOR CUFF OF RIGHT SHOULDER, INITIAL ENCOUNTER: ICD-10-CM

## 2020-05-19 DIAGNOSIS — M19.131 POST-TRAUMATIC OSTEOARTHRITIS, RIGHT WRIST: ICD-10-CM

## 2020-05-19 DIAGNOSIS — M75.41 IMPINGEMENT SYNDROME OF RIGHT SHOULDER: ICD-10-CM

## 2020-05-19 DIAGNOSIS — M75.21 BICIPITAL TENDINITIS, RIGHT SHOULDER: ICD-10-CM

## 2020-05-19 PROCEDURE — 99212 OFFICE O/P EST SF 10 MIN: CPT

## 2020-05-19 NOTE — PHYSICAL EXAM
[de-identified] : 4 views of the affected Right shoulder (AP, Glenoid, Y-View, Axillary)\par 5-2019\par demonstrate:\par normal bony calcification without dislocation and no fracture\par 	Arch	2B\par 	AC Joint	mild Arthrosis\par 	GH Joint	trace Arthrosis\par 	Calcifications	none\par \par MRI Right shoulder from [facility] on 5-21-19\par My impression of the images:\par Quality of the MRI is good\par Supraspinatous Tendon tear of the supraspinatus tendon of the entire footprint with retraction to the mid humeral head\par Infraspinatous Tendon tendinosis\par Subscapularis Tendon tendinosis with inferior articular partial tear\par Teres Minor Tendon ok\par Muscle Belly Atrophy none\par Biceps Tendon is in the groove and looks swollen intra-articularly but poorly visualized with a stable attachment anchor\par Superior Labrum ok\par Anterior Labrum ok\par Posterior Labrum ok\par AC joint mild signal\par There is no full thickness chondral lesion of the glenoid and humeral head\par \par The Final Radiologist Impression:\par The skin and subcutis are unremarkable.\par There is moderate AC joint osteoarthritis. No subacromial enthesophyte is visualized.\par There is a small effusion in the subacromial/subdeltoid bursa.\par There is a small glenohumeral joint effusion.\par There is a full-thickness tear involving the anterior two thirds of the supraspinatus\par tendon, with retraction of torn tendon fibers at the level of the lateral acromion. See\par oblique coronal images 14-21 of series 5, oblique sagittal images 5-7 of series 6. A\par full-thickness tear is an AP dimension of 1.8 cm.\par The infraspinatus tendon exhibits moderate tendinosis. There is associated edema at the\par myotendinous junction.\par The subscapularis tendon exhibits moderate tendinosis. There is an associated\par enthesopathic cyst at the insertion on the lesser tuberosity.\par The long head biceps tendon exhibits moderate tendinosis and tenosynovitis.\par The capsulolabral complex is unremarkable, without evidence of labrum tear or\par degeneration.\par No significant arthritic changes are visualized at the glenohumeral joint.\par There is no evidence of fracture, osteonecrosis, or osseous neoplasm.\par The suprascapular nerve is unremarkable in MR appearance.\par The visualized muscle bellies exhibit normal size, shape and signal, without evidence of\par edema or atrophy.\par No soft tissue mass is visualized.\par No soft tissue cyst or extrasynovial fluid collection is visualized.\par Impression:\par Rotator cuff tendinosis as detailed above, with a full-thickness tear of the supraspinatus\par tendon. Moderate AC joint osteoarthritis. Small joint and bursal effusions. Moderate long\par head biceps tendinosis and tenosynovitis. [de-identified] : Physical Examination\par General: well nourished, in no acute distress, alert and oriented to person, place and time\par Psychiatric: normal mood and affect, no abnormal movements or speech patterns\par Eyes: vision intact - glasses\par Throat: no thyromegaly\par Lymph: no enlarged nodes, no lymphedema in extremity\par Respiratory: no wheezing, no shortness of breath with ambulation\par Cardiac: no cardiac leg swelling, 2+ peripheral pulses\par Neurology: normal gross sensation in extremities to light touch\par Abdomen: soft, non-tender, tympanic, no masses\par \par Musculoskeletal Examination\par Cervical spine	Full painless range of motion and negative Spurling's test\par \par Shoulder			Right			Left\par Appearance\par      Skin/Swelling/Deformity	normal			normal\par      Scapular Winging		-			-\par Range of Motion\par      Forward Flexion		160 / 160		170 / 170\par      Abduction			150 / 150		170 / 170\par      External Rotation		45			45\par      Internal Rotation		L1			T10\par      SAbd Ext Rotation		80			90\par      SAbd Int Rotation		60			80\par      Painful Arc			-			-\par      Crepitus			-			-\par Palpation\par      Clavicle			-			-\par      AC Joint			-			-\par      Posterior Acromion		-			-\par      Levator Scapula		-			-\par      Lateral Bursa			-			-\par      Impingement Area		-			-\par      Biceps Tendon		-			-\par      Anterior Capsule		-			-\par Strength Examination\par      Supraspinatous 		5+ / 0			5+ / 0\par      Infraspinatous			5- / 0			5+ / 0\par      Subscapularis			5+ / 0			5+ / 0\par      Belly Press			5+ / 0			5+ / 0\par      Lift Off			-			-\par      Drop-Arm			-			-\par Special Examination\par      Biceps Akron's		-			-\par      Impingement Neer		-			-\par      Impingement Hawking		-			-\par \par Sensation\par      Axillary			normal			normal\par      LatAntCubBrach 		normal			normal\par      Median 			normal			normal\par      Ulnar 			normal			normal\par      Radial 			normal			normal\par Motor\par      AIN 				normal			normal\par      Ulnar 			normal			normal\par      Radial 			normal			normal\par      PIN 				normal			normal\par Pulses\par      Radial			2+			2+

## 2020-05-19 NOTE — DISCUSSION/SUMMARY
[de-identified] : 5+ mo status post Right Shoulder Arthroscopic surgery on 7-18-19\par Supraspinatus double row repair and undersurface PASTA repair of the infraspinatus\par Arthroscopic biceps tenotomy\par  [ Subacromial decompression ]\par \par WB status RUE\par  Activities as tolerated months 5+\par \par continue HEP\par \par Follow up:\par prn

## 2020-05-19 NOTE — HISTORY OF PRESENT ILLNESS
[de-identified] : Patient is status post Right Shoulder Arthroscopic surgery on 7-18-19\par Supraspinatus double row repair and undersurface PASTA repair of the infraspinatus\par Arthroscopic biceps tenotomy\par  [ Subacromial decompression ]\par \par \par \par The patient is doing well with improved no pain at rest and most activity and a 1-2/10 pain w full overhead activity. stiffness improved\par \par The patient denies shortness of breath, chest pain, numbness tingling, worsening calf pain or swelling.\par \par Consult by Dr Ziggy Phipps

## 2021-05-25 ENCOUNTER — APPOINTMENT (OUTPATIENT)
Dept: OPHTHALMOLOGY | Facility: CLINIC | Age: 66
End: 2021-05-25
Payer: MEDICARE

## 2021-05-25 ENCOUNTER — NON-APPOINTMENT (OUTPATIENT)
Age: 66
End: 2021-05-25

## 2021-05-25 PROCEDURE — 92004 COMPRE OPH EXAM NEW PT 1/>: CPT

## 2021-05-25 PROCEDURE — 92015 DETERMINE REFRACTIVE STATE: CPT

## 2022-02-15 ENCOUNTER — APPOINTMENT (OUTPATIENT)
Dept: ORTHOPEDIC SURGERY | Facility: CLINIC | Age: 67
End: 2022-02-15
Payer: MEDICARE

## 2022-02-15 VITALS — WEIGHT: 150 LBS | HEIGHT: 66 IN | BODY MASS INDEX: 24.11 KG/M2

## 2022-02-15 DIAGNOSIS — M17.11 UNILATERAL PRIMARY OSTEOARTHRITIS, RIGHT KNEE: ICD-10-CM

## 2022-02-15 DIAGNOSIS — M23.91 UNSPECIFIED INTERNAL DERANGEMENT OF RIGHT KNEE: ICD-10-CM

## 2022-02-15 PROCEDURE — 73564 X-RAY EXAM KNEE 4 OR MORE: CPT | Mod: RT

## 2022-02-15 PROCEDURE — 99214 OFFICE O/P EST MOD 30 MIN: CPT

## 2022-02-15 NOTE — PHYSICAL EXAM
[de-identified] : Physical Examination\par General: well nourished, in no acute distress, alert and oriented to person, place and time\par Psychiatric: normal mood and affect, no abnormal movements or speech patterns\par Eyes: vision intact [Without] glasses\par \par \par Musculoskeletal Examination\par Ambulation	[-] antalgic gait, [-] assistive devices\par \par Knee			Right			Left\par General\par      Swelling/Deformity	normal			normal	\par      Skin			normal			normal\par      Erythema		-			-\par      Standing Alignment	neutral			neutral\par      Effusion		trace			trace\par Range of Motion\par      Hip			full painless ROM		full painless ROM\par      Knee Flexion		125			125\par      Knee Extension	0			0\par Patella\par      J Sign		-			-\par      Quad Medial/Lateral	1/1 1/1\par      Apprehension		-			-\par      Nolan's		+			+\par      Grind Sign		+			+\par      Crepitus		+			+\par Palpation\par      Medial Joint Line	+			-\par      Medial Fem Condyle	-			-\par      Lateral Joint Line	-			-\par      Quad Tendon		-			-\par      Patella Tendon	-			-\par      Medial Patella		-			-\par      Lateral Patella 	-			-\par      Posterior Knee	-			-\par Ligamentous\par      Varus @ 0° / 30°	-/-			-/-\par      Valgus @ 0° / 30°	-/-			-/-\par      Lachman		-			-\par      Pivot Shift		-			-\par      Anterior Drawer	-			-\par      Posterior Drawer	-			-\par Meniscus\par      Isha		+ medial pop			-\par      Flexion Pinch		+ medial posterior pain			-\par Strength Examination/Atrophy\par      Hip Flexors 		5+			5+\par      Quadriceps		5+			5+\par      Hamstring		5+			5+\par      Tibialis Anterior	5+			5+\par      Achilles/Soleus	5+			5+\par Sensation\par      Deep Peroneal	normal			normal\par      Superficial Peroneal 	normal			normal\par      Sural  		normal			normal\par      Posterior Tibial 	normal			normal\par      Saphneous 		normal			normal\par Pulses\par      DP			2+			2+\par  [de-identified] : 4 views of the affected Right knee (standing AP, flexing standing AP, 30degree flexed lateral, sunrise view)\par were ordered, obtained and evaluated by myself today and\par demonstrate:\par There is no narrowing\par trace osteophytic lipping\par trace suprapatellar effusion\par Moderate to severe lateral patellofemoral joint space loss without evidence of tilt [or] subluxation on sunrise view\par Normal soft tissue density\par Otherwise normal osseous bone structure without fracture or dislocation

## 2022-02-15 NOTE — HISTORY OF PRESENT ILLNESS
[de-identified] : CC Right knee\par \par HPI 65 yo male presents with acute onset of several weeks to months of activity-related pain in the medial greater than anterior Right knee [without injury]. The pain is worse, and rated a #6 out of 10, described as sharp, [without radiation]. Rest makes the pain better and walking knee flexion makes the pain worse. The patient reports associated symptoms of pop and click stability. The patient [Denies] pain at night affecting sleep, and [Denies] similar pain previously[].\par \par The patient has tried the following treatments:\par Activity modification	+\par Ice/Compression  	+\par Braces    		[-]\par Nsaids    		+\par Physical Therapy 	[-]\par Cortisone Injection	[-]\par Visco Injection		[-]\par Zilretta			[-]\par Arthroscopy		[-]\par \par Review of Systems is positive for the above musculoskeletal symptoms and is otherwise non-contributory for general, constitutional, psychiatric, neurologic, HEENT, cardiac, respiratory, gastrointestinal, reproductive, lymphatic, and dermatologic complaints.\par \par Consult by  [ ]\par \par

## 2022-02-15 NOTE — DISCUSSION/SUMMARY
[de-identified] : Right knee internal derangement medial compartment\par Right knee patellofemoral osteophytes right and left knee\par \par I discussed my findings on history, exam and radiology.\par \par I reviewed the anatomy and function of the periarticular muscles and tendons, patella, ligaments, menisci and cartilage of the knee using models, images and diagrams. Given the current findings for the patient, I recommend proceeding with non-operative management of the knee consisting of the following:\par \par Patient education about the knee motions causing pain and possibly injury for activity modification\par \par Avoid deep knee bends and squating to prevent exacerbating knee injury\par \par Ice or warm compress\par \par Physical therapy prescription with VMO/Hip Abductor/Lumbar Core strengthening, ROM stretching, mobilization, modalities, HEP\par \par The patient was prescribed Diclofenac PO non-steroidal anti-inflammatory medication. 50mg tablets twice daily to be taken for at least 1-2 weeks in a row and then PRN afterwards. Risks and benefits were discussed and include but not limited to renal damage and GI ulceration and bleeding.  They were advised to take with food to limit stomach upset as well as warned to stop the medication if worsening gastric pain or dizziness or other side effects. Also to immediately stop the medication and seek appropriate medical attention if any severe stomach ache, gastritis, black/red vomit, black/red stools or any other medical concern.\par \par declined csi\par \par The patient verifies their understanding the the visit, diagnosis and plan. They agree with the treatment plan and will contact the office with any questions or problems.\par \par FU after PT completion if unimproved\par

## 2022-08-08 ENCOUNTER — RESULT REVIEW (OUTPATIENT)
Age: 67
End: 2022-08-08

## 2023-06-12 NOTE — H&P PST ADULT - BACK
Lov 04/05/2023  Nov 07/13/2023    ----- Message from Ila Palumbo RN sent at 6/12/2023  3:50 PM CDT -----  Regarding: refill  Contact: pt [phone]    ----- Message -----  From: Tiffanie Estrada  Sent: 6/12/2023   2:08 PM CDT  To: Sergo CHEN Staff    Type:  RX Refill Request    Who Called:  Pt   Refill or New Rx:  refill   RX Name and Strength:  HYDROcodone-acetaminophen (NORCO) 5-325 mg per tablet  How is the patient currently taking it? (ex. 1XDay):  4xday   Is this a 30 day or 90 day RX:  30  Preferred Pharmacy with phone number:    [facility] - [address] [address]  Phone: [phone] Fax: [phone]      Local or Mail Order:  Local   Ordering Provider:  Sergo Jimenez Call Back Number:  [phone] (home) [phone] (work)      Additional Information:  Pls call back and advise          No deformity or limitation of movement

## 2024-05-02 ENCOUNTER — APPOINTMENT (OUTPATIENT)
Dept: OTOLARYNGOLOGY | Facility: CLINIC | Age: 69
End: 2024-05-02
Payer: MEDICARE

## 2024-05-02 VITALS — WEIGHT: 150 LBS | BODY MASS INDEX: 24.11 KG/M2 | HEIGHT: 66 IN

## 2024-05-02 DIAGNOSIS — H93.8X3 OTHER SPECIFIED DISORDERS OF EAR, BILATERAL: ICD-10-CM

## 2024-05-02 PROCEDURE — 99204 OFFICE O/P NEW MOD 45 MIN: CPT | Mod: 25

## 2024-05-02 PROCEDURE — 92567 TYMPANOMETRY: CPT

## 2024-05-02 PROCEDURE — 92557 COMPREHENSIVE HEARING TEST: CPT

## 2024-05-02 PROCEDURE — 31231 NASAL ENDOSCOPY DX: CPT

## 2024-05-02 RX ORDER — DICLOFENAC SODIUM 50 MG/1
50 TABLET, DELAYED RELEASE ORAL
Qty: 60 | Refills: 1 | Status: DISCONTINUED | OUTPATIENT
Start: 2022-02-15 | End: 2024-05-02

## 2024-05-02 RX ORDER — OXYCODONE AND ACETAMINOPHEN 5; 325 MG/1; MG/1
5-325 TABLET ORAL
Qty: 42 | Refills: 0 | Status: DISCONTINUED | COMMUNITY
Start: 2019-07-03 | End: 2024-05-02

## 2024-05-02 RX ORDER — DICLOFENAC SODIUM 50 MG/1
50 TABLET, DELAYED RELEASE ORAL
Qty: 60 | Refills: 1 | Status: DISCONTINUED | COMMUNITY
Start: 2022-02-15 | End: 2024-05-02

## 2024-05-02 RX ORDER — STANDARDIZED SENNA CONCENTRATE 8.6 MG/1
8.6 TABLET ORAL
Qty: 30 | Refills: 0 | Status: DISCONTINUED | COMMUNITY
Start: 2019-07-03 | End: 2024-05-02

## 2024-05-02 RX ORDER — AMOXICILLIN AND CLAVULANATE POTASSIUM 875; 125 MG/1; MG/1
875-125 TABLET, COATED ORAL
Qty: 20 | Refills: 0 | Status: ACTIVE | COMMUNITY
Start: 2024-05-02 | End: 1900-01-01

## 2024-05-02 RX ORDER — CEPHALEXIN 500 MG/1
500 TABLET ORAL
Qty: 9 | Refills: 0 | Status: DISCONTINUED | COMMUNITY
Start: 2019-07-03 | End: 2024-05-02

## 2024-05-02 RX ORDER — DICLOFENAC SODIUM 50 MG/1
50 TABLET, DELAYED RELEASE ORAL
Qty: 60 | Refills: 1 | Status: DISCONTINUED | COMMUNITY
Start: 2019-05-07 | End: 2024-05-02

## 2024-05-02 RX ORDER — FLUTICASONE PROPIONATE 50 UG/1
50 SPRAY, METERED NASAL
Qty: 1 | Refills: 7 | Status: ACTIVE | COMMUNITY
Start: 2024-05-02 | End: 1900-01-01

## 2024-05-02 NOTE — HISTORY OF PRESENT ILLNESS
[de-identified] : 68 year old male presents for nasal congestion  3 weeks ago had a URI- increased nasal congestion, clear/yellow/ blood tinged anterior rhinorrhea, PND, clogged ears R>L, productive cough with yellow phlegm and reduced sense of smell-  Took Robitussin with some relief in cough  States other symptoms continue- lingering intermittent bilateral nasal congestion, clear anterior rhinorrhea, PND, Right clogged ear Sense of smell remains reduced Denies facial pain/pressure, fevers, recurrent sinus infections  Denies use of nasal sprays or sinuses Used Vicks inhaler as needed  + subj hearing loss No other otologic sx  PMH: denies PSH: shoulder surgery

## 2024-05-13 LAB — EAR NOSE AND THROAT CULTURE: ABNORMAL

## 2024-05-29 ENCOUNTER — APPOINTMENT (OUTPATIENT)
Dept: OTOLARYNGOLOGY | Facility: CLINIC | Age: 69
End: 2024-05-29

## 2024-06-20 ENCOUNTER — APPOINTMENT (OUTPATIENT)
Dept: OTOLARYNGOLOGY | Facility: CLINIC | Age: 69
End: 2024-06-20
Payer: MEDICARE

## 2024-06-20 VITALS — HEIGHT: 66 IN | BODY MASS INDEX: 24.75 KG/M2 | WEIGHT: 154 LBS

## 2024-06-20 DIAGNOSIS — R09.89 OTHER SPECIFIED SYMPTOMS AND SIGNS INVOLVING THE CIRCULATORY AND RESPIRATORY SYSTEMS: ICD-10-CM

## 2024-06-20 PROCEDURE — 99213 OFFICE O/P EST LOW 20 MIN: CPT | Mod: 25

## 2024-06-20 PROCEDURE — 31231 NASAL ENDOSCOPY DX: CPT

## 2024-06-20 NOTE — HISTORY OF PRESENT ILLNESS
[de-identified] : 68 year old male hx chronic nasal congestion presents for follow up V 5/2/24at which time sinus culture grew few Staphylococcus epidermidis, Few Streptococcus salivarius/vestibularis group, Few Neisseria flavescens, Rare Prevotella species, Few Streptococcus parasanguinis Completed Augmentin with significant relief  Tried Flonase daily for about 10 days with improvement  No recent nasal congestion, anterior rhinorrhea, PND, facial pain/pressure, epistaxis Sense of smell is good No current use of nasal sprays or rinses

## 2024-09-10 ENCOUNTER — NON-APPOINTMENT (OUTPATIENT)
Age: 69
End: 2024-09-10

## 2024-09-11 ENCOUNTER — NON-APPOINTMENT (OUTPATIENT)
Age: 69
End: 2024-09-11